# Patient Record
Sex: MALE | Race: WHITE
[De-identification: names, ages, dates, MRNs, and addresses within clinical notes are randomized per-mention and may not be internally consistent; named-entity substitution may affect disease eponyms.]

---

## 2018-02-22 ENCOUNTER — HOSPITAL ENCOUNTER (EMERGENCY)
Dept: HOSPITAL 41 - JD.ED | Age: 33
Discharge: HOME | End: 2018-02-22
Payer: SELF-PAY

## 2018-02-22 DIAGNOSIS — Z79.899: ICD-10-CM

## 2018-02-22 DIAGNOSIS — L02.216: Primary | ICD-10-CM

## 2018-02-22 PROCEDURE — 36415 COLL VENOUS BLD VENIPUNCTURE: CPT

## 2018-02-22 PROCEDURE — 96375 TX/PRO/DX INJ NEW DRUG ADDON: CPT

## 2018-02-22 PROCEDURE — 80053 COMPREHEN METABOLIC PANEL: CPT

## 2018-02-22 PROCEDURE — 74177 CT ABD & PELVIS W/CONTRAST: CPT

## 2018-02-22 PROCEDURE — 85025 COMPLETE CBC W/AUTO DIFF WBC: CPT

## 2018-02-22 PROCEDURE — 99284 EMERGENCY DEPT VISIT MOD MDM: CPT

## 2018-02-22 PROCEDURE — 96365 THER/PROPH/DIAG IV INF INIT: CPT

## 2018-02-22 PROCEDURE — 86140 C-REACTIVE PROTEIN: CPT

## 2018-02-22 NOTE — EDM.PDOC
ED HPI GENERAL MEDICAL PROBLEM





- General


Chief Complaint: Abdominal Pain


Stated Complaint: ABSCESS ON STOMACH


Time Seen by Provider: 02/22/18 14:24


Source of Information: Reports: Patient


History Limitations: Reports: No Limitations





- History of Present Illness


INITIAL COMMENTS - FREE TEXT/NARRATIVE: 





Patient is a 32-year-old male who presents to the ED complaining of redness, 

swelling, and drainage from the naval area that started 4 days ago. Pain has  

progressively gotten worse as well redness. There is purulent drainage present 

that has been draining out. Pain is worse with palpation and also standing/ 

walking. He has no fever, nausea/vomiting, history of MRSA, or recent trauma 

that precipitated this.


  ** Abdominal


Pain Score (Numeric/FACES): 9





- Related Data


 Allergies











Allergy/AdvReac Type Severity Reaction Status Date / Time


 


No Known Allergies Allergy   Verified 02/22/18 14:05











Home Meds: 


 Home Meds





Doxycycline [Vibramycin] 100 mg PO DAILY #28 tab 02/22/18 [Rx]











Past Medical History





- Past Health History


Medical/Surgical History: Denies Medical/Surgical History





Social & Family History





- Tobacco Use


Years of Tobacco use: 5





- Recreational Drug Use


Recreational Drug Use: No





ED ROS GENERAL





- Review of Systems


Review Of Systems: ROS reveals no pertinent complaints other than HPI.





ED EXAM, GI/ABD





- Physical Exam


Exam: See Below


Exam Limited By: No Limitations


General Appearance: Alert, WD/WN, Mild Distress


Ears: Hearing Grossly Normal


Nose: Normal Inspection


Throat/Mouth: Normal Voice, No Airway Compromise


Neck: Normal Inspection, Supple


Respiratory/Chest: No Respiratory Distress, Lungs Clear, Normal Breath Sounds, 

No Accessory Muscle Use, Chest Non-Tender


Cardiovascular: Normal Peripheral Pulses, Regular Rate, Rhythm


GI/Abdominal Exam: Normal Bowel Sounds, Soft, No Organomegaly, Tender (

Tenderness noted around the umbilical area proximally 7 cm circumferentially in 

all directions. Mild redness noted. Swollen umbilicus with purulent drainage 

noted.)


Neurological: Alert, Oriented, CN II-XII Intact, Normal Cognition, No Motor/

Sensory Deficits


Psychiatric: Normal Affect, Normal Mood


Skin Exam: Warm, Dry





Course





- Vital Signs


Last Recorded V/S: 


 Last Vital Signs











Temp  97.0 F   02/22/18 14:05


 


Pulse  71   02/22/18 14:05


 


Resp      


 


BP  138/95 H  02/22/18 14:05


 


Pulse Ox  96   02/22/18 14:05














- Orders/Labs/Meds


Orders: 


 Active Orders 24 hr











 Category Date Time Status


 


 Peripheral IV Care [RC] .AS DIRECTED Care  02/22/18 14:48 Active


 


 Peripheral IV Insertion Adult [OM.PC] Routine Oth  02/22/18 14:47 Ordered











Labs: 


 Laboratory Tests











  02/22/18 02/22/18 Range/Units





  15:02 15:02 


 


WBC  9.55 H   (4.23-9.07)  K/mm3


 


RBC  5.29   (4.63-6.08)  M/mm3


 


Hgb  15.9   (13.7-17.5)  gm/L


 


Hct  44.8   (40.1-51.0)  %


 


MCV  84.7   (79.0-92.2)  fl


 


MCH  30.1   (25.7-32.2)  pg


 


MCHC  35.5   (32.2-35.5)  g/dl


 


RDW Std Deviation  40.0   (35.1-43.9)  fL


 


Plt Count  225   (163-337)  K/mm3


 


MPV  10.3   (9.4-12.3)  fl


 


Neut % (Auto)  69.3 H   (34.0-67.9)  %


 


Lymph % (Auto)  22.8   (21.8-53.1)  %


 


Mono % (Auto)  7.2   (5.3-12.2)  %


 


Eos % (Auto)  0.4 L   (0.8-7.0)  


 


Baso % (Auto)  0.2   (0.1-1.2)  %


 


Neut # (Auto)  6.61 H   (1.78-5.38)  K/mm3


 


Lymph # (Auto)  2.18   (1.32-3.57)  K/mm3


 


Mono # (Auto)  0.69   (0.30-0.82)  K/mm3


 


Eos # (Auto)  0.04   (0.04-0.54)  K/mm3


 


Baso # (Auto)  0.02   (0.01-0.08)  K/mm3


 


Sodium   142  (136-145)  mEq/L


 


Potassium   3.6  (3.5-5.1)  mEq/L


 


Chloride   104  ()  mEq/L


 


Carbon Dioxide   26  (21-32)  mEq/L


 


Anion Gap   15.6 H  (5-15)  


 


BUN   16  (7-18)  mg/dL


 


Creatinine   0.9  (0.7-1.3)  mg/dL


 


Est Cr Clr Drug Dosing   128.14  mL/min


 


Estimated GFR (MDRD)   > 60  (>60)  mL/min


 


BUN/Creatinine Ratio   17.8  (14-18)  


 


Glucose   89  ()  mg/dL


 


Calcium   8.9  (8.5-10.1)  mg/dL


 


Total Bilirubin   0.5  (0.2-1.0)  mg/dL


 


AST   14 L  (15-37)  U/L


 


ALT   23  (16-63)  U/L


 


Alkaline Phosphatase   74  ()  U/L


 


C-Reactive Protein   1.8 H*  (<1.0)  mg/dL


 


Total Protein   7.5  (6.4-8.2)  g/dl


 


Albumin   3.9  (3.4-5.0)  g/dl


 


Globulin   3.6  gm/dL


 


Albumin/Globulin Ratio   1.1  (1-2)  











Meds: 


Medications














Discontinued Medications














Generic Name Dose Route Start Last Admin





  Trade Name Freq  PRN Reason Stop Dose Admin


 


Diatrizoate Meglum/Diatrizoate Sod  90 ml  02/22/18 15:10  02/22/18 15:55





  Gastrografin 37%  PO  02/22/18 15:11  90 ml





  ONETIME ONE   Administration


 


Doxycycline Hyclate 100 mg/  100 mls @ 100 mls/hr  02/22/18 16:38  02/22/18 16:

57





  Sodium Chloride  IV  02/22/18 17:37  100 mls/hr





  ONETIME ONE   Administration


 


Iopamidol  125 ml  02/22/18 15:10  02/22/18 15:56





  Isovue-300 (61%)  IVPUSH  02/22/18 15:11  125 ml





  ONETIME ONE   Administration


 


Morphine Sulfate  2 mg  02/22/18 14:47  02/22/18 14:59





  Morphine  IVPUSH  02/22/18 14:48  2 mg





  ONETIME ONE   Administration


 


Ondansetron HCl  4 mg  02/22/18 18:22  02/22/18 18:23





  Zofran Odt  PO  02/22/18 18:23  4 mg





  ONETIME STA   Administration


 


Ondansetron HCl  Confirm  02/22/18 18:28  





  Zofran Odt  Administered  02/22/18 18:29  





  Dose   





  4 mg   





  .ROUTE   





  .STK-MED ONE   


 


Sodium Chloride  10 ml  02/22/18 14:47  02/22/18 14:59





  Saline Flush  FLUSH   10 ml





  ASDIRECTED PRN   Administration





  Keep Vein Open   


 


Sodium Chloride  10 ml  02/22/18 15:10  02/22/18 15:56





  Saline Flush  FLUSH   10 ml





  ONETIME PRN   Administration





  IV FLUSH   














- Re-Assessments/Exams


Free Text/Narrative Re-Assessment/Exam: 


IV established with morphine 2 mg IVP. 





Initial labs and studies include CBC, chem 14, CRP. 





Ultrasound performed the ED unsuccessful secondary to increasing pain. 





Will order a CT of the abdomen and pelvis to rule out possible abscess.





Labs reviewed: CBC essentially normal. Chemistry panel essentially normal. CRP 

1.8.





CT abdomen and pelvis impression: Inflammatory change with low density center 

centered at the umbilicus highly suspicious for umbilical abscess. Abscess 

measures about 2.3 cm. Other portions of the CT exam of the abdomen and pelvis 

are within normal limits.





Orderd doxcycline IV.  





Discharge instructions as documented. 








Departure





- Departure


Time of Disposition: 16:40


Disposition: Home, Self-Care 01


Condition: Good


Clinical Impression: 


 Abscess of umbilicus








- Discharge Information


Prescriptions: 


Doxycycline [Vibramycin] 100 mg PO DAILY #28 tab


Instructions:  Skin Abscess, Skin Abscess, Easy-to-Read


Referrals: 


Vinay Flynn MD [Physician] - 


Forms:  ED Department Discharge, ED Return to Work/School Form


Additional Instructions: 


As discussed you have a umbilical abscess.Treatment will include: Doxycycline 

100 mg twice a day for 14 days. Warm compresses to affected area 4-6 times daily

, 30 minutes in duration, if draining keep covered. Cleanse site twice daily 

with soap and water, pat dry, reapply dressing. Follow-up with PCP the 

conclusion of therapy to ensure resolution. If no improvements over the weekend 

please contact Dr. Flynn General Surgeon for an appointment to have it 

drained.  Return to the ED if you develop any new or worsening symptoms. Patient states she held dose for 5 days due to injections for her back. She resume her dose back on Tuesday.

## 2018-02-22 NOTE — CT
CT abdomen and pelvis

 

Technique: Multiple axial sections were obtained from above the dome 

of the diaphragm inferiorly through the pubic symphysis.  Intravenous 

and oral contrast was utilized.  Delayed images were also obtained 

from above the kidneys inferiorly through the pubic symphysis.

 

Comparison: No prior study.

 

Findings: Mild inflammatory change with low density center is seen 

within the umbilicus measuring 2.3 cm.  Findings are highly suspicious

 for abscess within the abdominal wall at the umbilicus.

 

Visualized lung bases shows nothing acute.  Liver shows no focal 

abnormality.  Spleen appears within normal limits.  Adrenal glands 

show no nodule.  Pancreas is within normal limits.  Kidneys show 

symmetric contrast enhancement without hydronephrosis or mass.  

Gallbladder contains no calcified gallstones.  Aorta shows no 

aneurysmal dilatation.  No retroperitoneal adenopathy or mesenteric 

abnormalities are seen.  No pelvic mass or adenopathy is seen.

 

Delayed images shows contrast within the ureters and within the 

bladder.  Appendix is seen and appears normal.

 

Bone window settings were reviewed which appears within normal limits 

for the patient's age.

 

Impression:

1.  Inflammatory change with low density center centered at the 

umbilicus highly suspicious for umbilical abscess.  Abscess measures 

about 2.3 cm.

2.  Other portions of the CT exam of the abdomen and pelvis are within

 normal limits.

 

Diagnostic code #3

## 2018-09-19 ENCOUNTER — HOSPITAL ENCOUNTER (EMERGENCY)
Dept: HOSPITAL 75 - ER | Age: 33
Discharge: LEFT BEFORE DISCHARGE | End: 2018-09-19
Payer: SELF-PAY

## 2018-09-19 DIAGNOSIS — R09.81: Primary | ICD-10-CM
